# Patient Record
(demographics unavailable — no encounter records)

---

## 2024-10-10 NOTE — ASSESSMENT
[FreeTextEntry1] : 67M with ET >20 years. He had suboptimal control with primidone, propranolol and gabapentin. He has b/l hand, head, jaw, and leg tremor. His tremor affects his ability to perform ADLs and some tasks of his work. He is seeking advanced treatment with DBS.  I have discussed this patients symptoms with them. His tremor significant interferes with his quality of life. I think that this patient is a good candidate for bilateral deep brain stimulation of the ventral intermediate nucleus of the thalamus.  I have discussed in detail the process of the surgery. I discussed that the surgery will take place in two different stages, Stage I and Stage II that it will take place at [facility]. The patient would be under local anesthesia and would have a stereotactic frame placed around the head prior to having a head CT stealth done before proceeding with the placement of the electrodes. The patient would be awake during stage 1 surgery to participate in assessments in order to optimize placement of the electrode for greatest benefit and smallest side effect. The lead can then be adjusted in the operating room if needed. The patient would have a routine post op head CT again after surgery and would spend the night, and would likely go home the following day with PO pain meds, and about a week of scheduled antibiotics.  The patient would return to the hospital ~ 2 weeks later for the Stage II procedure, which would be the subcutaneous implantation of the pulse generator and lead extension and would be done under general anesthesia. This would be an outpatient procedure and the patient would go home that same day along with pain meds and scheduled antibiotics for ~1 week.  I discussed the risk and benefits of the procedure including bleeding, infection, seizures, stroke, heart or lung issues, no benefit, the need for reoperations, hardware malfunction or failure, and sustained side effects including paralysis, paresthesias, eye deviations, mood, changes, and voice changes. I also discussed the need for several programing sessions afterwards.  The patient shows good understanding of the procedure, the risk and benefits, and wishes to proceed with the procedure as soon as possible.  PLAN: Referral to neuropsychology Referral to movement disorder neurologist  If pt chooses to proceed with DBS the following imaging will be ordered for surgical planning, target determination, and tractography: CT brain, stealth 3T Siemens Carissa, Research magnet, MRI brain with/without contrast, DBS protocol  Harvinder  Would need cardiac and medical clearance  I, Dr. Jose Blanton, personally performed the evaluation and management (E/M) services for this new patient.  That E/M includes conducting the clinically appropriate initial history &/or exam, assessing all conditions, and establishing the plan of care.  Today, my CARLOS, Keisha Vaughn, was here to observe my evaluation and management service for this patient & follow plan of care established by me going forward.

## 2024-10-10 NOTE — PHYSICAL EXAM
[General Appearance - Alert] : alert [General Appearance - In No Acute Distress] : in no acute distress [Oriented To Time, Place, And Person] : oriented to person, place, and time [Affect] : the affect was normal [Mood] : the mood was normal [Person] : oriented to person [Place] : oriented to place [Time] : oriented to time [Cranial Nerves Oculomotor (III)] : extraocular motion intact [Cranial Nerves Trigeminal (V)] : facial sensation intact symmetrically [Cranial Nerves Facial (VII)] : face symmetrical [Cranial Nerves Accessory (XI - Cranial And Spinal)] : head turning and shoulder shrug symmetric [Cranial Nerves Hypoglossal (XII)] : there was no tongue deviation with protrusion [Motor Strength] : muscle strength was normal in all four extremities [Motor Handedness Left-Handed] : the patient is left hand dominant [Sensation Tactile Decrease] : light touch was intact [] : no respiratory distress [Respiration, Rhythm And Depth] : normal respiratory rhythm and effort [FreeTextEntry8] : +head, + jaw tremor, b/l rest tremor L>R, postural with arms extended: 5 cm left, 1-2 cm right, winged: 5-10 cm left, 1-2 cm right, finger to nose:  3 cm right, 3 cm left. Can't tandem gait or tandem stance. Can stand with feet together side by side with sway.

## 2024-10-10 NOTE — CONSULT LETTER
[Dear  ___] : Dear  [unfilled], [( Thank you for referring [unfilled] for consultation for _____ )] : Thank you for referring [unfilled] for consultation for [unfilled] [Please see my note below.] : Please see my note below. [Consult Closing:] : Thank you very much for allowing me to participate in the care of this patient.  If you have any questions, please do not hesitate to contact me. [Sincerely,] : Sincerely, [FreeTextEntry3] : Jose Blanton MD Director, Functional Neurosurgery Deep Brain Stimulation/Focused Ultrasound Program Department of Neurosurgery  [address], [facility] [address] [place], [place] [address] P [phone] F [phone]

## 2024-10-10 NOTE — HISTORY OF PRESENT ILLNESS
[de-identified] : BRAULIO GOLDMAN is a 67 year old left handed male with PMH of essential tremor, hyperthyroid, anxiety, depression, hyperlipidemia, CAD s/p quadrouple bypass (almost 3 years ago), KAITLIN. He takes plavix and Eliquis (the day he had quadruple bypass went into a-fib). He presents to the office for neurosurgical consultation for deep brain stimulation. He recently established care with neurologist Dr. Gómez. He was worked up for high intensity focused ultrasound by Dr. Gurrola several months ago and his skull density ratio was too low. He was diagnosed with ET about 20 years ago. He has tried multiple medications including Primidone, propranolol, and gabapentin. No benefit from any of them. Not currently on any tremor medicine. Tremor is in both hands L>R, his legs, face and his head. He has family history of tremor in his mother, and 2 of his sisters.  Tremor worsens with stress, anxiety, and caffeine. Tremor improves with alcohol. Tremor is present at rest and with activity. He can't write at all. Typing, text messaging, shaving, eating soup are all challenging. His balance is off. He does not use ambulation aids. He recently fell. He has a part time job and is required to fill out paperwork which he has to try to do with right hand.  His tremor doesn't affect his ability to drive.   Dr. Hodgson [facility]

## 2024-10-10 NOTE — CONSULT LETTER
[Dear  ___] : Dear  [unfilled], [( Thank you for referring [unfilled] for consultation for _____ )] : Thank you for referring [unfilled] for consultation for [unfilled] [Please see my note below.] : Please see my note below. [Consult Closing:] : Thank you very much for allowing me to participate in the care of this patient.  If you have any questions, please do not hesitate to contact me. [Sincerely,] : Sincerely, [FreeTextEntry3] : Jose Blanton MD Director, Functional Neurosurgery Deep Brain Stimulation/Focused Ultrasound Program Department of Neurosurgery  [address], [facility] [address] [place], [place] [address] P [phone] F [phone]

## 2024-10-10 NOTE — HISTORY OF PRESENT ILLNESS
[de-identified] : BRAULIO GOLDMAN is a 67 year old left handed male with PMH of essential tremor, hyperthyroid, anxiety, depression, hyperlipidemia, CAD s/p quadrouple bypass (almost 3 years ago), KAITLIN. He takes plavix and Eliquis (the day he had quadruple bypass went into a-fib). He presents to the office for neurosurgical consultation for deep brain stimulation. He recently established care with neurologist Dr. Gómez. He was worked up for high intensity focused ultrasound by Dr. Gurrola several months ago and his skull density ratio was too low. He was diagnosed with ET about 20 years ago. He has tried multiple medications including Primidone, propranolol, and gabapentin. No benefit from any of them. Not currently on any tremor medicine. Tremor is in both hands L>R, his legs, face and his head. He has family history of tremor in his mother, and 2 of his sisters.  Tremor worsens with stress, anxiety, and caffeine. Tremor improves with alcohol. Tremor is present at rest and with activity. He can't write at all. Typing, text messaging, shaving, eating soup are all challenging. His balance is off. He does not use ambulation aids. He recently fell. He has a part time job and is required to fill out paperwork which he has to try to do with right hand.  His tremor doesn't affect his ability to drive.   Dr. Hodgson [facility]

## 2024-10-10 NOTE — ASSESSMENT
[FreeTextEntry1] : 67M with ET >20 years. He had suboptimal control with primidone, propranolol and gabapentin. He has b/l hand, head, jaw, and leg tremor. His tremor affects his ability to perform ADLs and some tasks of his work. He is seeking advanced treatment with DBS.  I have discussed this patients symptoms with them. His tremor significant interferes with his quality of life. I think that this patient is a good candidate for bilateral deep brain stimulation of the ventral intermediate nucleus of the thalamus.  I have discussed in detail the process of the surgery. I discussed that the surgery will take place in two different stages, Stage I and Stage II that it will take place at [facility]. The patient would be under local anesthesia and would have a stereotactic frame placed around the head prior to having a head CT stealth done before proceeding with the placement of the electrodes. The patient would be awake during stage 1 surgery to participate in assessments in order to optimize placement of the electrode for greatest benefit and smallest side effect. The lead can then be adjusted in the operating room if needed. The patient would have a routine post op head CT again after surgery and would spend the night, and would likely go home the following day with PO pain meds, and about a week of scheduled antibiotics.  The patient would return to the hospital ~ 2 weeks later for the Stage II procedure, which would be the subcutaneous implantation of the pulse generator and lead extension and would be done under general anesthesia. This would be an outpatient procedure and the patient would go home that same day along with pain meds and scheduled antibiotics for ~1 week.  I discussed the risk and benefits of the procedure including bleeding, infection, seizures, stroke, heart or lung issues, no benefit, the need for reoperations, hardware malfunction or failure, and sustained side effects including paralysis, paresthesias, eye deviations, mood, changes, and voice changes. I also discussed the need for several programing sessions afterwards.  The patient shows good understanding of the procedure, the risk and benefits, and wishes to proceed with the procedure as soon as possible.  PLAN: Referral to neuropsychology Referral to movement disorder neurologist  If pt chooses to proceed with DBS the following imaging will be ordered for surgical planning, target determination, and tractography: CT brain, stealth 3T Siemens Carissa, Research magnet, MRI brain with/without contrast, DBS protocol  Harvinder  Would need cardiac and medical clearance  I, Dr. Jose Blanton, personally performed the evaluation and management (E/M) services for this new patient.  That E/M includes conducting the clinically appropriate initial history &/or exam, assessing all conditions, and establishing the plan of care.  Today, my CARLOS, Keisha Vaughn, was here to observe my evaluation and management service for this patient & follow plan of care established by me going forward.

## 2024-10-23 NOTE — DISCUSSION/SUMMARY
[FreeTextEntry1] : 67-year-old male presents for DBS consultation. He has a reported history of ET for about 20 years effecting his ability to write, drink and eat. He considered HIFU in the past but was deemed ineligible. He is interested in DBS to help reduce tremors when eating. On exam there are marked parkinsonian features such as asymmetric rest tremor involving the upper and lower limbs, mild bradykinesia and shuffling gait. His presentation is concerning for ET plus a parkinsonian syndrome. HAYLEY scan would help differentiate if idiopathic vs drug induced. He has a history of neuroleptic use although timeline is not coinciding with the development of his symptoms. His rest tremor unmasked after an emergency CABG in 2021.   After an extensive discussion about my impression to the patient and his wife, it was decided to work up his symptoms and try medications to potentially address his tremor. Patient prefers to defer DBS until medication efficacy is reassessed and HAYLEY scan is obtained.  Patient was counseled on the following recommendations -Obtain HAYLEY scan -Initiate trail of amantadine 100mg BID. AEs discussed  f/u will be arranged in 6 months with one of the movement disorders MDs.

## 2024-10-23 NOTE — DISCUSSION/SUMMARY
[FreeTextEntry1] : 67-year-old male presents for DBS consultation. He has a reported history of ET for about 20 years effecting his ability to write, drink and eat. He considered HIFU in the past but was deemed ineligible. He is interested in DBS to help reduce tremors when eating. On exam there are marked parkinsonian features such as asymmetric rest tremor involving the upper and lower limbs, mild bradykinesia and shuffling gait. His presentation is concerning for ET plus a parkinsonian syndrome. HAYLEY scan would help differentiate if idiopathic vs drug induced. He has a history of neuroleptic use although timeline is not coinciding with the development of his symptoms. His rest tremor unmasked after an emergency CABG in 2021.   After an extensive discussion about my impression to the patient and his wife, it was decided to work up his symptoms and try medications to potentially address his tremor. Patient prefers to defer DBS until medication efficacy is reassessed and HYALEY scan is obtained.  Patient was counseled on the following recommendations -Obtain HAYLEY scan -Initiate trail of amantadine 100mg BID. AEs discussed  f/u will be arranged in 6 months with one of the movement disorders MDs.

## 2024-10-23 NOTE — PHYSICAL EXAM
[General Appearance - Alert] : alert [Oriented To Time, Place, And Person] : oriented to person, place, and time [Cranial Nerves Oculomotor (III)] : extraocular motion intact [FreeTextEntry1] : +2 Facial hypomimia, reduced blink rate +2 lip tremor  Vertical eye movements intact without square wave jerks +1 Hypophonic speech +2 Rigidity of neck rotation +2 LUE  Rigidity of limbs present with contralateral activation  +3 LUE, +2 LLE +2 RUE Resting tremor +2 Action tremor +2 Postural tremor  +2 L>R Bradykinesia with finger tapping, hand supination/pronation, foot tapping, foot stomping. No dysmetria with finger to nose Gait: able to stand with hands crossed and without assistance +2 Flexed posture Slow gait initiation, good stride length, reduced left arm swing with activation of resting tremor, no freezing of gait upon turning, requires multiple steps with turning, shuffles. negative pull test

## 2024-10-25 NOTE — HISTORY OF PRESENT ILLNESS
[FreeTextEntry1] : AGE year old SEX with ~~ tremor for the last ~~ years but became more noticeable x ~ years. Patient finds that the tremor becomes more pronounced with ~~~. Difficulty with the following ADLs:  Does/Does not notice a difference in the tremor after consuming alcohol.    Patient denies/reports history of neuroleptic use. Has/has not tried any medications for the tremor in the past. Denies feeling any slowness or rigidity. Gait is stable.    Nonmotor:   -Sleep, daytime somnolence  -Urinary  -Bowel  -Mood   -No changes in cognition/VH      Family history:   Social history:  PMHx:

## 2024-12-03 NOTE — HISTORY OF PRESENT ILLNESS
[FreeTextEntry1] : 67 year old L>R male presents with reports of hand tremors for the last 20 years. 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 That neurologist prescribed Gabapentin, primidone and propranolol with no releif. Does not recall experiencing side effects. He does not remember how high either medication was dosed. He most recently established care with Dr Gómez who referred him to Dr Blanton.  Tremors are in both hands effecting his handwriting. He states his tremors first involved his head and then his left hand. He shaves with 2 hands, needs to hold a cup with 2 hands. Tremor improves with alcohol. Patient reports his rest tremor began in 2021 after his heart surgery.   Reports balance has been off since before the pandemic. Now feels it is getting worse. Has fallen. Last one was a few weeks ago while walking in his yard.  Does not use a cane or a walker. Denies shuffling or FOG. Holds on to shopping cart when out shopping. Has never done PT for his gait and balance.   12/3/2024 HAYLEY scan was abnormal. Trial of amantadine was not tolerated due to worsening depression.  Patient had a fall last week while dragging garbage to the curb. Hit the right side of his head, went to ED had negative head CT Both OCD and depression are not stable at this time, psychiatry is adjusting medications His rexulti and buspar were discontinued about 3 weeks ago. Now on trintellix  Was recently treated for URI.   Sleep, KAITLIN, wife reports many years of talking and arm flailing  Manages his constipation with miralax for the last 15 years No frequency or urgency  +some memory issues but manages his own finances no VH  no difficulty with swallowing  Never had a HAYLEY scan  Family Hx: mother had severe dementia and tremors. 2 sisters have tremor.    No tremor meds at this time   ClomiprAMINE 250 QHS  ELIQUIS 5 BID PLAVIX ATORVASTATIN  VITAMIN D3  CLONAZEPAM 1 MG QAM   PMHx anxiety, depression, OCD, GERD, CAD s/p CABG 2021   past medication olanzapine  seroquel  remron prim prop alyssa

## 2024-12-03 NOTE — DISCUSSION/SUMMARY
[FreeTextEntry1] : 67-year-old male presents for follow up. Has a reported history of ET for about 20 years effecting his ability to write, drink and eat. His presentation is concerning for ET plus a parkinsonian syndrome. Further supported by abnormal HAYLEY scan.   He has a history of neuroleptic use although timeline is not coinciding with the development of his symptoms. His rest tremor unmasked after an emergency CABG in 2021. Psychiatrist is currently adjusting medications for his OCD and depression. Advised to start L-dopa after he sees his psychiatrist tomorrow. Patient would like to exhaust all medication options prior to considering DBS surgery.   Patient was counseled on the following recommendations - Initiate Sinemet 25/100 1 tablet QD at 8 am x 1 week, then 1 tablet BID at 8am and 12pm x 1 week, then 1 tablet TID at 8am, 12pm and 4pm thereafter. AEs disucssed - Encouraged to increase exercise and physical activity and maintain an active social and intellectual life.  fu in 2 months

## 2025-02-14 NOTE — DISCUSSION/SUMMARY
[FreeTextEntry1] : 67-year-old male presents for follow up. ET/Parkinsonism that is responding to low dose l-dopa. Patient is still bothered by tremors at this time. He is working with psychiatry to treat with ECT since multiple medications have not been effective. NPT advises against DBS surgery at this time. He is also not interested in surgery and prefers to continue trying medications.   Patient was counseled on the following recommendations Increase sinemet to 1 tab TID 8-12-4 x 1 week then titrate to 2 tabs TID over 3 weeks. Instructions provided to pt and his wife. f/u psychiatry  Encouraged to increase exercise and physical activity and maintain an active social and intellectual life. Obtain brain MRI  Obtain FDG PET  f/u 3 months

## 2025-02-14 NOTE — HISTORY OF PRESENT ILLNESS
[FreeTextEntry1] : 67 year old L>R male presents with reports of hand tremors for the last 20 years. 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 That neurologist prescribed Gabapentin, primidone and propranolol with no releif. Does not recall experiencing side effects. He does not remember how high either medication was dosed. He most recently established care with Dr Gómez who referred him to Dr Blanton.  Tremors are in both hands effecting his handwriting. He states his tremors first involved his head and then his left hand. He shaves with 2 hands, needs to hold a cup with 2 hands. Tremor improves with alcohol. Patient reports his rest tremor began in 2021 after his heart surgery.   Reports balance has been off since before the pandemic. Now feels it is getting worse. Has fallen. Last one was a few weeks ago while walking in his yard.  Does not use a cane or a walker. Denies shuffling or FOG. Holds on to shopping cart when out shopping. Has never done PT for his gait and balance.   12/3/2024 HAYLEY scan was abnormal. Trial of amantadine was not tolerated due to worsening depression.  Patient had a fall last week while dragging garbage to the curb. Hit the right side of his head, went to ED had negative head CT Both OCD and depression are not stable at this time, psychiatry is adjusting medications His rexulti and buspar were discontinued about 3 weeks ago. Now on trintellix  Was recently treated for URI.   2/11/25 Patient is tolerating sinemet. Usually takes 1 tab BID, forgets to take the middle dose often. He is interested in getting ECT treatments at [facility]. NPT revealed a-MCI and unstable OCD, depression and anxiety. Gait and balance is stable. Tremors breakthrough throughout the day   Sleep, KAITLIN, wife reports many years of talking and arm flailing  Manages his constipation with miralax for the last 15 years No frequency or urgency  +some memory issues, forgetfull of medications and past medications he has tried  no VH  no difficulty with swallowing  Never had a HAYLEY scan  Family Hx: mother had severe dementia and tremors. 2 sisters have tremor.    No tremor meds at this time   ClomiprAMINE 250 QHS  desvenlafaxine 50mg  ELIQUIS 5 BID PLAVIX ATORVASTATIN  VITAMIN D3  CLONAZEPAM 1 MG QAM    PMHx anxiety, depression, OCD, GERD, CAD s/p CABG 2021   past medication olanzapine  seroquel  remron prim prop alyssa

## 2025-02-14 NOTE — PHYSICAL EXAM
[FreeTextEntry1] : +2 Facial hypomimia, reduced blink rate +2 lip tremor  Vertical eye movements intact without square wave jerks +1 Hypophonic speech 0 Rigidity of neck rotation 0 Rigidity of limbs present with contralateral activation  +2 BUE Resting tremor +1 L>R Action tremor 0 Postural tremor  +1 L>R Bradykinesia with finger tapping, hand supination/pronation, foot tapping, foot stomping. No dysmetria with finger to nose Gait: able to stand with hands crossed and without assistance +1 Flexed posture Normal gait initiation, good stride length, reduced left arm swing, no freezing of gait upon turning, requires multiple steps with turning. No shuffling  negative pull test

## 2025-02-14 NOTE — HISTORY OF PRESENT ILLNESS
[FreeTextEntry1] : 67 year old L>R male presents with reports of hand tremors for the last 20 years. 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 That neurologist prescribed Gabapentin, primidone and propranolol with no releif. Does not recall experiencing side effects. He does not remember how high either medication was dosed. He most recently established care with Dr Gómez who referred him to Dr Blanton.  Tremors are in both hands effecting his handwriting. He states his tremors first involved his head and then his left hand. He shaves with 2 hands, needs to hold a cup with 2 hands. Tremor improves with alcohol. Patient reports his rest tremor began in 2021 after his heart surgery.   Reports balance has been off since before the pandemic. Now feels it is getting worse. Has fallen. Last one was a few weeks ago while walking in his yard.  Does not use a cane or a walker. Denies shuffling or FOG. Holds on to shopping cart when out shopping. Has never done PT for his gait and balance.   12/3/2024 HAYLEY scan was abnormal. Trial of amantadine was not tolerated due to worsening depression.  Patient had a fall last week while dragging garbage to the curb. Hit the right side of his head, went to ED had negative head CT Both OCD and depression are not stable at this time, psychiatry is adjusting medications His rexulti and buspar were discontinued about 3 weeks ago. Now on trintellix  Was recently treated for URI.   2/11/25 Patient is tolerating sinemet. Usually takes 1 tab BID, forgets to take the middle dose often. He is interested in getting ECT treatments at [facility]. NPT revealed a-MCI and unstable OCD, depression and anxiety. Gait and balance is stable. Tremors breakthrough throughout the day   Sleep, KAITLIN, wife reports many years of talking and arm flailing  Manages his constipation with miralax for the last 15 years No frequency or urgency  +some memory issues, forgetfull of medications and past medications he has tried  no VH  no difficulty with swallowing  Never had a HAYLEY scan  Family Hx: mother had severe dementia and tremors. 2 sisters have tremor.    No tremor meds at this time   ClomiprAMINE 250 QHS  desvenlafaxine 50mg  ELIQUIS 5 BID PLAVIX ATORVASTATIN  VITAMIN D3  CLONAZEPAM 1 MG QAM    PMHx anxiety, depression, OCD, GERD, CAD s/p CABG 2021   past medication olanzapine  seroquel  remron prim prop alyssa

## 2025-05-06 NOTE — HISTORY OF PRESENT ILLNESS
[FreeTextEntry1] : 67 year old L>R male presents with reports of hand tremors for the last 20 years. 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 That neurologist prescribed Gabapentin, primidone and propranolol with no releif. Does not recall experiencing side effects. He does not remember how high either medication was dosed. He most recently established care with Dr Gómez who referred him to Dr Blanton.  Tremors are in both hands effecting his handwriting. He states his tremors first involved his head and then his left hand. He shaves with 2 hands, needs to hold a cup with 2 hands. Tremor improves with alcohol. Patient reports his rest tremor began in 2021 after his heart surgery. Reports balance has been off since before the pandemic. Now feels it is getting worse. Has fallen. Last one was a few weeks ago while walking in his yard. Does not use a cane or a walker. Denies shuffling or FOG. Holds on to shopping cart when out shopping. Has never done PT for his gait and balance.  HAYLEY scan was abnormal. Trial of amantadine was not tolerated due to worsening depression. Both OCD and depression are not stable at this time, psychiatry is adjusting medications and he started ECT 2 weeks ago. Of note he  Patient is tolerating sinemet. Usually takes 1 tab BID, forgets to take the middle dose often. He is not aware of kinetics NPT revealed a-MCI and unstable OCD, depression and anxiety. His OCD manifests as obsessive thoughts. Denies SI. Gait and balance is stable. Tremors breakthrough throughout the day   Nonmotor Sleep, KAITLIN, wife reports many years of talking and arm flailing Manages his constipation with miralax for the last 15 years No frequency or urgency +some memory issues, forgetfull of medications and past medications he has tried no VH no difficulty with swallowing  Family Hx: mother had severe dementia . 2 sisters have tremor.  No tremor meds at this time  Meds sinemet  2 tab BID (AM, hs) ClomiprAMINE 250 QHS PLAVIX ATORVASTATIN VITAMIN D3 CLONAZEPAM 1 MG QAM toprol   PMHx anxiety, depression, OCD, GERD, CAD s/p CABG 2021  past medication olanzapine discontinued < 1yr seroquel remron primidone prop gabapentin rexulti <1 yr trinteillx < 1yr

## 2025-05-06 NOTE — PHYSICAL EXAM
[Person] : oriented to person [Place] : oriented to place [Time] : oriented to time [Motor Strength] : muscle strength was normal in all four extremities [FreeTextEntry1] : Last dose >12hrs There is mild head tremor with jaw tremor. There is mild VT on phonation. EOMI. Speech is 1+ There is minimal rest tremor in his thumbs with 1+ PT and KT BL. There is 1+ bradykinesia R>L Tone is normal. Walks with reduced right armswing and activation of hand tremor. No FOG. Pull test negative. Mild wide base of station when walking. There is a pronounced writing tremor in left hand and spirals have moderate high frequency waveforms.

## 2025-05-06 NOTE — DISCUSSION/SUMMARY
[FreeTextEntry1] : 67M with ET/PD who has severe depression and OCD requiring ECT that commenced 2 weeks ago. His parkinsonian symptoms have improved with ECT; the effects are likely transient. He has MCI on NPT and FDG PET showing metabolic pattern suggestive of FTD. 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   Since tremors are affecting his QOL, FUS or DBS are options for him. However, he needs to be stabilized psychiatrically before we can move forward with an advanced therapy. I recommended that he take sinemet  1.5 tabs TID to assist with his mild gait unsteadiness  RTC 3months